# Patient Record
Sex: MALE | Race: WHITE | NOT HISPANIC OR LATINO | URBAN - METROPOLITAN AREA
[De-identification: names, ages, dates, MRNs, and addresses within clinical notes are randomized per-mention and may not be internally consistent; named-entity substitution may affect disease eponyms.]

---

## 2017-03-03 ENCOUNTER — IMPORTED ENCOUNTER (OUTPATIENT)
Dept: URBAN - METROPOLITAN AREA CLINIC 50 | Facility: CLINIC | Age: 72
End: 2017-03-03

## 2018-03-09 ENCOUNTER — IMPORTED ENCOUNTER (OUTPATIENT)
Dept: URBAN - METROPOLITAN AREA CLINIC 50 | Facility: CLINIC | Age: 73
End: 2018-03-09

## 2019-05-31 ENCOUNTER — IMPORTED ENCOUNTER (OUTPATIENT)
Dept: URBAN - METROPOLITAN AREA CLINIC 50 | Facility: CLINIC | Age: 74
End: 2019-05-31

## 2019-06-03 ENCOUNTER — IMPORTED ENCOUNTER (OUTPATIENT)
Dept: URBAN - METROPOLITAN AREA CLINIC 50 | Facility: CLINIC | Age: 74
End: 2019-06-03

## 2019-06-12 ENCOUNTER — IMPORTED ENCOUNTER (OUTPATIENT)
Dept: URBAN - METROPOLITAN AREA CLINIC 50 | Facility: CLINIC | Age: 74
End: 2019-06-12

## 2019-06-25 ENCOUNTER — IMPORTED ENCOUNTER (OUTPATIENT)
Dept: URBAN - METROPOLITAN AREA CLINIC 50 | Facility: CLINIC | Age: 74
End: 2019-06-25

## 2019-07-03 ENCOUNTER — IMPORTED ENCOUNTER (OUTPATIENT)
Dept: URBAN - METROPOLITAN AREA CLINIC 50 | Facility: CLINIC | Age: 74
End: 2019-07-03

## 2019-07-03 NOTE — PATIENT DISCUSSION
"""S/P IOL OD: Tecnis ZCB00 23 (ORA) +ORA/Femto +Omidria. Continue post operative instructions and drops per schedule.  """

## 2019-07-09 ENCOUNTER — IMPORTED ENCOUNTER (OUTPATIENT)
Dept: URBAN - METROPOLITAN AREA CLINIC 50 | Facility: CLINIC | Age: 74
End: 2019-07-09

## 2019-07-17 ENCOUNTER — IMPORTED ENCOUNTER (OUTPATIENT)
Dept: URBAN - METROPOLITAN AREA CLINIC 50 | Facility: CLINIC | Age: 74
End: 2019-07-17

## 2019-07-17 NOTE — PATIENT DISCUSSION
"""S/P IOL OS: Tecnis ZCB00 22.5 +Femto/Arcs +Omidria. Continue post operative instructions and drops per schedule.  """

## 2019-07-23 ENCOUNTER — IMPORTED ENCOUNTER (OUTPATIENT)
Dept: URBAN - METROPOLITAN AREA CLINIC 50 | Facility: CLINIC | Age: 74
End: 2019-07-23

## 2019-08-19 ENCOUNTER — IMPORTED ENCOUNTER (OUTPATIENT)
Dept: URBAN - METROPOLITAN AREA CLINIC 50 | Facility: CLINIC | Age: 74
End: 2019-08-19

## 2019-11-19 ENCOUNTER — IMPORTED ENCOUNTER (OUTPATIENT)
Dept: URBAN - METROPOLITAN AREA CLINIC 50 | Facility: CLINIC | Age: 74
End: 2019-11-19

## 2020-11-24 ENCOUNTER — IMPORTED ENCOUNTER (OUTPATIENT)
Dept: URBAN - METROPOLITAN AREA CLINIC 50 | Facility: CLINIC | Age: 75
End: 2020-11-24

## 2020-11-24 NOTE — PATIENT DISCUSSION
"""Informed patient that their capsular opacification is visually significant and meets the minimum criteria for capsulotomy by YAG laser to increase their vision and decrease their glare symptoms. RBAs of procedure discussed.  "" ""Start Prednisolone Acetate left eye twice a day

## 2020-12-22 ENCOUNTER — IMPORTED ENCOUNTER (OUTPATIENT)
Dept: URBAN - METROPOLITAN AREA CLINIC 50 | Facility: CLINIC | Age: 75
End: 2020-12-22

## 2021-04-18 ASSESSMENT — TONOMETRY
OS_IOP_MMHG: 17
OS_IOP_MMHG: 13
OS_IOP_MMHG: 18
OD_IOP_MMHG: 17
OS_IOP_MMHG: 15
OD_IOP_MMHG: 13
OD_IOP_MMHG: 14
OS_IOP_MMHG: 15
OD_IOP_MMHG: 35
OD_IOP_MMHG: 14
OS_IOP_MMHG: 17
OS_IOP_MMHG: 14
OD_IOP_MMHG: 14
OS_IOP_MMHG: 37
OS_IOP_MMHG: 14
OD_IOP_MMHG: 14
OS_IOP_MMHG: 13
OS_IOP_MMHG: 16
OD_IOP_MMHG: 13
OS_IOP_MMHG: 13
OD_IOP_MMHG: 14
OD_IOP_MMHG: 14

## 2021-04-18 ASSESSMENT — VISUAL ACUITY
OS_SC: 20/40-
OD_BAT: 20/50-
OD_CC: 20/30+
OS_BAT: 20/30
OD_OTHER: 20/30. 20/50.
OS_CC: J1+
OD_SC: 20/25
OS_PH: 20/25-1
OS_OTHER: 20/50-. 20/50-.
OS_BAT: 20/50-
OD_CC: 20/30+2
OD_OTHER: 20/50-. 20/60-.
OD_SC: 20/25-2
OD_BAT: 20/50-
OD_CC: 20/20
OS_SC: 20/50
OD_OTHER: 20/30. 20/50-.
OS_CC: 20/30+
OS_OTHER: 20/50-. 20/60-.
OS_BAT: 20/50
OD_OTHER: 20/60. 20/200.
OS_BAT: 20/50-
OS_BAT: 20/50-
OD_BAT: 20/30
OD_CC: J1+@ 16 IN
OS_CC: 20/30
OD_BAT: 20/30
OS_SC: 20/30-1
OS_OTHER: 20/50-. 20/50-.
OD_CC: J1+
OS_CC: J1+@ 16 IN
OD_CC: J1+
OS_CC: 20/30+2
OD_SC: 20/70
OS_CC: J1@ 16 IN
OD_OTHER: 20/50-. 20/60-.
OS_CC: 20/25-
OS_SC: 20/30+
OS_CC: 20/100
OS_OTHER: 20/30. 20/50-.
OD_SC: 20/30
OD_CC: 20/25-
OD_CC: J1+/-
OS_SC: 20/25
OD_OTHER: 20/30. 20/50.
OS_OTHER: 20/50. 20/70.
OD_SC: 20/25-
OD_CC: 20/20
OD_CC: J1@ 16 IN
OD_BAT: 20/30
OD_SC: 20/25-
OD_BAT: 20/60
OS_CC: 20/20
OS_OTHER: 20/40. 20/60.
OS_CC: J1+/-
OS_BAT: 20/40
OS_CC: J1+

## 2022-01-25 ENCOUNTER — PREPPED CHART (OUTPATIENT)
Dept: URBAN - METROPOLITAN AREA CLINIC 50 | Facility: CLINIC | Age: 77
End: 2022-01-25

## 2022-02-03 ENCOUNTER — COMPREHENSIVE EXAM (OUTPATIENT)
Dept: URBAN - METROPOLITAN AREA CLINIC 50 | Facility: CLINIC | Age: 77
End: 2022-02-03

## 2022-02-03 DIAGNOSIS — H04.123: ICD-10-CM

## 2022-02-03 DIAGNOSIS — E11.9: ICD-10-CM

## 2022-02-03 DIAGNOSIS — H35.373: ICD-10-CM

## 2022-02-03 DIAGNOSIS — H43.813: ICD-10-CM

## 2022-02-03 DIAGNOSIS — H26.491: ICD-10-CM

## 2022-02-03 PROCEDURE — 92134 CPTRZ OPH DX IMG PST SGM RTA: CPT

## 2022-02-03 PROCEDURE — 92014 COMPRE OPH EXAM EST PT 1/>: CPT

## 2022-02-03 ASSESSMENT — VISUAL ACUITY
OD_CC: 20/20
OS_CC: 20/20
OD_GLARE: 20/25
OU_SC: 20/20-2
OS_SC: 20/30
OD_GLARE: 20/50
OU_CC: 20/20
OU_CC: J1+
OD_SC: 20/20-1

## 2022-02-03 ASSESSMENT — TONOMETRY
OS_IOP_MMHG: 15
OD_IOP_MMHG: 14

## 2022-02-03 NOTE — PATIENT DISCUSSION
There was a diagnosis of glaucoma suspect. Reassured patient there are no signs and at this time no need for testing or drop therapy.

## 2023-08-17 ENCOUNTER — COMPREHENSIVE EXAM (OUTPATIENT)
Dept: URBAN - METROPOLITAN AREA CLINIC 50 | Facility: LOCATION | Age: 78
End: 2023-08-17

## 2023-08-17 DIAGNOSIS — H26.491: ICD-10-CM

## 2023-08-17 DIAGNOSIS — E11.9: ICD-10-CM

## 2023-08-17 DIAGNOSIS — H35.373: ICD-10-CM

## 2023-08-17 DIAGNOSIS — H04.123: ICD-10-CM

## 2023-08-17 DIAGNOSIS — H43.813: ICD-10-CM

## 2023-08-17 PROCEDURE — 92014 COMPRE OPH EXAM EST PT 1/>: CPT

## 2023-08-17 PROCEDURE — 92134 CPTRZ OPH DX IMG PST SGM RTA: CPT

## 2023-08-17 ASSESSMENT — TONOMETRY
OD_IOP_MMHG: 16
OS_IOP_MMHG: 16

## 2023-08-17 ASSESSMENT — VISUAL ACUITY
OS_CC: 20/20-2
OD_CC: 20/20-1
OU_CC: J1+-2@16"
OD_GLARE: 20/30
OD_GLARE: 20/25
OU_CC: 20/20-1

## 2023-08-17 ASSESSMENT — KERATOMETRY
OD_K2POWER_DIOPTERS: 43.75
OD_AXISANGLE2_DEGREES: 87
OS_K1POWER_DIOPTERS: 42.75
OS_AXISANGLE2_DEGREES: 56
OD_AXISANGLE_DEGREES: 177
OD_K1POWER_DIOPTERS: 43.00
OS_AXISANGLE_DEGREES: 146
OS_K2POWER_DIOPTERS: 43.00

## 2024-09-10 ENCOUNTER — COMPREHENSIVE EXAM (OUTPATIENT)
Dept: URBAN - METROPOLITAN AREA CLINIC 50 | Facility: LOCATION | Age: 79
End: 2024-09-10

## 2024-09-10 DIAGNOSIS — E11.9: ICD-10-CM

## 2024-09-10 DIAGNOSIS — H43.813: ICD-10-CM

## 2024-09-10 DIAGNOSIS — H26.491: ICD-10-CM

## 2024-09-10 DIAGNOSIS — H04.123: ICD-10-CM

## 2024-09-10 DIAGNOSIS — H35.373: ICD-10-CM

## 2024-09-10 PROCEDURE — 92015 DETERMINE REFRACTIVE STATE: CPT

## 2024-09-10 PROCEDURE — 92134 CPTRZ OPH DX IMG PST SGM RTA: CPT

## 2024-09-10 PROCEDURE — 99214 OFFICE O/P EST MOD 30 MIN: CPT
